# Patient Record
Sex: FEMALE | Race: WHITE | URBAN - NONMETROPOLITAN AREA
[De-identification: names, ages, dates, MRNs, and addresses within clinical notes are randomized per-mention and may not be internally consistent; named-entity substitution may affect disease eponyms.]

---

## 2017-03-28 ENCOUNTER — OFFICE VISIT (OUTPATIENT)
Dept: ENDOCRINOLOGY | Facility: CLINIC | Age: 37
End: 2017-03-28

## 2017-03-28 VITALS
SYSTOLIC BLOOD PRESSURE: 124 MMHG | HEIGHT: 65 IN | DIASTOLIC BLOOD PRESSURE: 74 MMHG | WEIGHT: 293 LBS | HEART RATE: 112 BPM | BODY MASS INDEX: 48.82 KG/M2

## 2017-03-28 DIAGNOSIS — E11.9 TYPE 2 DIABETES MELLITUS WITHOUT COMPLICATION, WITH LONG-TERM CURRENT USE OF INSULIN (HCC): Primary | ICD-10-CM

## 2017-03-28 DIAGNOSIS — Z79.4 TYPE 2 DIABETES MELLITUS WITHOUT COMPLICATION, WITH LONG-TERM CURRENT USE OF INSULIN (HCC): Primary | ICD-10-CM

## 2017-03-28 DIAGNOSIS — I15.2 HYPERTENSION ASSOCIATED WITH DIABETES (HCC): ICD-10-CM

## 2017-03-28 DIAGNOSIS — E83.42 HYPOMAGNESEMIA: ICD-10-CM

## 2017-03-28 DIAGNOSIS — E11.59 HYPERTENSION ASSOCIATED WITH DIABETES (HCC): ICD-10-CM

## 2017-03-28 DIAGNOSIS — E53.8 B12 DEFICIENCY: ICD-10-CM

## 2017-03-28 DIAGNOSIS — E55.9 VITAMIN D DEFICIENCY: ICD-10-CM

## 2017-03-28 LAB
GLUCOSE BLDC GLUCOMTR-MCNC: 107 MG/DL (ref 70–130)
HBA1C MFR BLD: 6.8 %
LDLC SERPL CALC-MCNC: 133 MG/DL
TRIGL SERPL-MCNC: 92 MG/DL
TSH SERPL DL<=0.005 MIU/L-ACNC: 2.4 M[IU]/ML (ref 0.27–4.2)
VITAMIN D 25: 55

## 2017-03-28 PROCEDURE — 99214 OFFICE O/P EST MOD 30 MIN: CPT | Performed by: INTERNAL MEDICINE

## 2017-03-28 PROCEDURE — 82962 GLUCOSE BLOOD TEST: CPT | Performed by: INTERNAL MEDICINE

## 2017-03-28 RX ORDER — VALACYCLOVIR HYDROCHLORIDE 1 G/1
TABLET, FILM COATED ORAL
Refills: 2 | COMMUNITY
Start: 2017-02-13

## 2017-03-28 RX ORDER — ATOMOXETINE HYDROCHLORIDE 40 MG/1
CAPSULE ORAL
Refills: 2 | COMMUNITY
Start: 2017-03-13

## 2017-03-28 RX ORDER — PIOGLITAZONEHYDROCHLORIDE 15 MG/1
TABLET ORAL
Qty: 30 TABLET | Refills: 11 | Status: SHIPPED | OUTPATIENT
Start: 2017-03-28 | End: 2018-04-22 | Stop reason: SDUPTHER

## 2017-03-28 RX ORDER — LANCETS 33 GAUGE
EACH MISCELLANEOUS
Refills: 5 | COMMUNITY
Start: 2017-02-13

## 2017-03-28 RX ORDER — ERGOCALCIFEROL 1.25 MG/1
CAPSULE ORAL
Qty: 12 CAPSULE | Refills: 11 | Status: SHIPPED | OUTPATIENT
Start: 2017-03-28 | End: 2018-04-04 | Stop reason: SDUPTHER

## 2017-03-28 RX ORDER — CLORAZEPATE DIPOTASSIUM 7.5 MG/1
TABLET ORAL
Refills: 2 | COMMUNITY
Start: 2017-02-21

## 2017-03-28 RX ORDER — FLUCONAZOLE 100 MG/1
TABLET ORAL
Refills: 2 | COMMUNITY
Start: 2017-03-20

## 2017-03-28 NOTE — PROGRESS NOTES
Rhonda Toribio is a 36 y.o. female who presents for  evaluation of   Chief Complaint   Patient presents with   • Diabetes       Referring provider    Primary Care Provider    Gerry Harvey MD        History of Present Illness  Duration/Timing:  Diabetes mellitus type 2, Age at onset of diabetes: 26 years  constant    now controlled    Severity (Complications/Hospitalizations)  Secondary Macrovascular Complications:  No CAD, No CVA, No PAD  Secondary Microvascular Complications:  No Diabetic Nephropathy, No proteinuria, No Diabetic Retinopathy, No Diabetic Neuropathy    Context  Diabetes Regimen:  Insulin, Oral Medications, Compliant with regimen, Side effects from regimen diarrhea w metformin  Blood Glucose Readings  at goal   Exercise:  Does not exercise    Associated Signs/Symptoms  Hyperglycemic Symptoms:  No polyuria, No polydipsia, No polyphagia, Weight gain  Hypoglycemic Episodes:  No documented hypoglycemia      Past Medical History:   Diagnosis Date   • Hypomagnesemia    • Obesity    • Type 2 diabetes mellitus    • Vitamin D deficiency      Family History   Problem Relation Age of Onset   • Diabetes Other      Social History   Substance Use Topics   • Smoking status: Never Smoker   • Smokeless tobacco: None   • Alcohol use No         Current Outpatient Prescriptions:   •  amphetamine-dextroamphetamine (ADDERALL) 20 MG tablet, Take 20 mg by mouth Daily., Disp: , Rfl:   •  clorazepate (TRANXENE) 3.75 MG tablet, Take 3.75 mg by mouth Daily., Disp: , Rfl:   •  Dulaglutide (TRULICITY) 0.75 MG/0.5ML solution pen-injector, Inject 0.75 mg under the skin 1 (One) Time Per Week., Disp: 4 pen, Rfl: 11  •  Empagliflozin (JARDIANCE) 10 MG tablet, Take 1 tablet by mouth Daily., Disp: 30 tablet, Rfl: 11  •  Insulin Glargine (TOUJEO SOLOSTAR) 300 UNIT/ML solution pen-injector, Inject 100 Units under the skin 2 (Two) Times a Day., Disp: 14 pen, Rfl: 11  •  Insulin Lispro (HUMALOG KWIKPEN) 200 UNIT/ML solution pen-injector,  Inject 60 Units under the skin 3 (Three) Times a Day With Meals. up to 50 units with meals , if not approved use novolog., Disp: 9 pen, Rfl: 11  •  Insulin Pen Needle 30G X 8 MM misc, 5 x daily , any brand, Disp: 150 each, Rfl: 11  •  lisinopril (PRINIVIL,ZESTRIL) 10 MG tablet, Take 10 mg by mouth Daily., Disp: , Rfl:   •  PARoxetine (PAXIL) 40 MG tablet, Take 40 mg by mouth Every Morning. Take before Noon., Disp: , Rfl:   •  pioglitazone (ACTOS) 15 MG tablet, 1 tab po daily, Disp: 30 tablet, Rfl: 11  •  vitamin D (ERGOCALCIFEROL) 66793 UNITS capsule capsule, Take 3 per week, Disp: 12 capsule, Rfl: 11  •  clorazepate (TRANXENE) 7.5 MG tablet, TAKE 1 TABLET BY MOUTH AT BEDTIME FOR TMJ, Disp: , Rfl: 2  •  fluconazole (DIFLUCAN) 100 MG tablet, TAKE 1 TABLET BY MOUTH EVERY 72 HOURS FOR 3 DOSES, THEN WEEKLY FOR 6 MONTHS, Disp: , Rfl: 2  •  ONETOUCH DELICA LANCETS 33G misc, USE TO CHECK BLOOD SUGAR FASTING AND AT BEDTIME, Disp: , Rfl: 5  •  STRATTERA 40 MG capsule, TAKE 1 CAPSULE BY MOUTH EVERY DAY, Disp: , Rfl: 2  •  valACYclovir (VALTREX) 1000 MG tablet, TAKE 2 TABLETS BY MOUTH EVERY 12 HOURS FOR 1 DAY, Disp: , Rfl: 2    Review of Systems    Review of Systems   Constitutional: Negative for activity change, appetite change, chills, diaphoresis, fatigue, fever and unexpected weight change.   HENT: Negative for congestion, dental problem, drooling, ear discharge, ear pain, facial swelling, mouth sores, postnasal drip, rhinorrhea, sinus pressure, sore throat, tinnitus, trouble swallowing and voice change.    Eyes: Negative for photophobia, pain, discharge, redness, itching and visual disturbance.   Respiratory: Negative for apnea, cough, choking, chest tightness, shortness of breath, wheezing and stridor.    Cardiovascular: Negative for chest pain, palpitations and leg swelling.   Gastrointestinal: Negative for abdominal distention, abdominal pain, constipation, diarrhea, nausea and vomiting.   Endocrine: Negative for cold  "intolerance, heat intolerance, polydipsia, polyphagia and polyuria.   Genitourinary: Negative for decreased urine volume, difficulty urinating, dysuria, flank pain, frequency, hematuria and urgency.   Musculoskeletal: Negative for arthralgias, back pain, gait problem, joint swelling, myalgias, neck pain and neck stiffness.   Skin: Negative for color change, pallor, rash and wound.   Allergic/Immunologic: Negative for immunocompromised state.   Neurological: Negative for dizziness, tremors, seizures, syncope, facial asymmetry, speech difficulty, weakness, light-headedness, numbness and headaches.   Hematological: Negative for adenopathy.   Psychiatric/Behavioral: Negative for agitation, behavioral problems, confusion, decreased concentration, dysphoric mood, hallucinations, self-injury, sleep disturbance and suicidal ideas. The patient is not nervous/anxious and is not hyperactive.         Objective:   /74 (BP Location: Left arm, Patient Position: Sitting, Cuff Size: Large Adult)  Pulse 112  Ht 65\" (165.1 cm)  Wt (!) 362 lb 6.4 oz (164 kg)  BMI 60.31 kg/m2    Physical Exam   Constitutional: She is oriented to person, place, and time. She appears well-developed.   HENT:   Head: Normocephalic.   Right Ear: External ear normal.   Left Ear: External ear normal.   Nose: Nose normal.   Eyes: Conjunctivae and EOM are normal. No scleral icterus.   Neck: Normal range of motion. Neck supple. No tracheal deviation present. No thyromegaly present.   Cardiovascular: Normal rate, regular rhythm, normal heart sounds and intact distal pulses.  Exam reveals no gallop and no friction rub.    No murmur heard.  Pulmonary/Chest: Effort normal and breath sounds normal. No stridor. No respiratory distress. She has no wheezes. She has no rales. She exhibits no tenderness.   Abdominal: Soft. Bowel sounds are normal. She exhibits no distension and no mass. There is no tenderness. There is no rebound and no guarding. "   Musculoskeletal: Normal range of motion. She exhibits no tenderness or deformity.    Rhonda had a diabetic foot exam performed today.   During the foot exam she had a monofilament test performed.    Vascular Status -  Her exam exhibits right foot vasculature normal. Her exam exhibits left foot vasculature normal.   Skin Integrity  -  Her right foot skin is intact.     Rhonda 's left foot skin is intact. .  Lymphadenopathy:     She has no cervical adenopathy.   Neurological: She is alert and oriented to person, place, and time. She displays normal reflexes. She exhibits normal muscle tone. Coordination normal.   Skin: No rash noted. No erythema. No pallor.   Psychiatric: She has a normal mood and affect. Her behavior is normal. Judgment and thought content normal.       Lab Review    Results for orders placed or performed in visit on 03/28/17   Hemoglobin A1c   Result Value Ref Range    External Hemoglobin A1C 6.8     LDL Cholesterol  133 mg/dL    Triglycerides 92 mg/dL    TSH, High Sensitivity 2.400 0.270 - 4.200 m[IU]/mL    25 Hydroxy, Vitamin D 55    POC Glucose Fingerstick   Result Value Ref Range    Glucose 107 70 - 130 mg/dL           Assessment/Plan         ICD-10-CM ICD-9-CM   1. Type 2 diabetes mellitus without complication, with long-term current use of insulin E11.9 250.00    Z79.4 V58.67   2. Hypertension associated with diabetes E11.59 250.80    I10 401.9   3. Vitamin D deficiency E55.9 268.9   4. Hypomagnesemia E83.42 275.2   5. B12 deficiency E53.8 266.2             Glycemic Management:      Lab Results   Component Value Date    HGBA1C 6.8 12/30/2016     Allergic to metformin    trulicity 0.75 mg weekly    jardiance 10 mg before breakfast    Toujeo  at 100  units twice daily       ---    Humalog  U 200   2 units for every gram that you eat of carbohydrates    actos 15 mg daily     Lipid Management      Lab Results   Component Value Date    TRIG 92 12/30/2016       Lab Results   Component Value Date     LDLCALC 133 12/30/2016     No need for statin due to age       Blood Pressure Management  nl on lisinopril 10 mg daily   Microvascular Complication Monitoring:  No Microalbuminuria, Date of last Microalbumin Assessment 03/15/2016, No Diabetic Retinopathy, No Diabetic Neuropathy  Immunizations:  Last influenza immunization 2016, Last pneumococcal immunization - pending    Preventive Care:  Patient is not smoking  Weight Related:  Obesity  has explored on bariatric surgery    when insurance approves    --    tested for ELEUTERIO in 2-16  [facility]  vit D  5o th 3 x w +  5 th u daily of d3       rule out celiac    hypomagnesemia, calculate FeMg, rule out celiac  low mg -  Measure next time     I reviewed and summarized records from Gerry Harvey MD from 2017 and I reviewed / ordered labs.     Orders Placed This Encounter   Procedures   • Hemoglobin A1c     This order was created through External Result Entry   • CBC Auto Differential   • Comprehensive Metabolic Panel   • Hemoglobin A1c   • Lipid Panel   • Microalbumin / Creatinine Urine Ratio   • TSH   • Vitamin B12   • Vitamin D 25 Hydroxy   • POC Glucose Fingerstick         A copy of my note was sent to Gerry Harvey MD    Please see my above opinion and suggestions.

## 2017-12-13 RX ORDER — INSULIN GLARGINE 300 U/ML
INJECTION, SOLUTION SUBCUTANEOUS
Qty: 3 PEN | Refills: 5 | Status: SHIPPED | OUTPATIENT
Start: 2017-12-13

## 2018-01-19 ENCOUNTER — TELEPHONE (OUTPATIENT)
Dept: ENDOCRINOLOGY | Facility: CLINIC | Age: 38
End: 2018-01-19

## 2018-02-19 ENCOUNTER — TELEPHONE (OUTPATIENT)
Dept: ENDOCRINOLOGY | Facility: CLINIC | Age: 38
End: 2018-02-19

## 2018-02-19 NOTE — TELEPHONE ENCOUNTER
Status   Sent to [employer]   DrugToujeo SoloStar 300UNIT/ML pen-injectors   Form[employer] Electronic PA Form (NCPDP)

## 2018-02-20 ENCOUNTER — TELEPHONE (OUTPATIENT)
Dept: ENDOCRINOLOGY | Facility: CLINIC | Age: 38
End: 2018-02-20

## 2018-02-23 ENCOUNTER — TELEPHONE (OUTPATIENT)
Dept: ENDOCRINOLOGY | Facility: CLINIC | Age: 38
End: 2018-02-23

## 2018-04-04 RX ORDER — ERGOCALCIFEROL 1.25 MG/1
CAPSULE ORAL
Qty: 12 CAPSULE | Refills: 9 | Status: SHIPPED | OUTPATIENT
Start: 2018-04-04

## 2018-04-23 RX ORDER — PIOGLITAZONEHYDROCHLORIDE 15 MG/1
TABLET ORAL
Qty: 30 TABLET | Refills: 11 | Status: SHIPPED | OUTPATIENT
Start: 2018-04-23

## 2018-08-21 RX ORDER — CANAGLIFLOZIN 100 MG/1
TABLET, FILM COATED ORAL
Qty: 30 TABLET | Refills: 5 | Status: SHIPPED | OUTPATIENT
Start: 2018-08-21

## 2018-10-03 RX ORDER — INSULIN DEGLUDEC INJECTION 100 U/ML
INJECTION, SOLUTION SUBCUTANEOUS
Qty: 5 PEN | Refills: 5 | Status: SHIPPED | OUTPATIENT
Start: 2018-10-03

## 2018-12-03 RX ORDER — INSULIN DEGLUDEC INJECTION 100 U/ML
INJECTION, SOLUTION SUBCUTANEOUS
Refills: 5 | OUTPATIENT
Start: 2018-12-03

## 2018-12-18 RX ORDER — INSULIN DEGLUDEC INJECTION 100 U/ML
INJECTION, SOLUTION SUBCUTANEOUS
Refills: 5 | OUTPATIENT
Start: 2018-12-18

## 2019-04-02 RX ORDER — ERGOCALCIFEROL 1.25 MG/1
CAPSULE ORAL
Qty: 12 CAPSULE | Refills: 8 | OUTPATIENT
Start: 2019-04-02

## 2019-05-07 RX ORDER — PIOGLITAZONEHYDROCHLORIDE 15 MG/1
TABLET ORAL
Qty: 30 TABLET | Refills: 9 | OUTPATIENT
Start: 2019-05-07